# Patient Record
Sex: MALE | Race: WHITE | NOT HISPANIC OR LATINO
[De-identification: names, ages, dates, MRNs, and addresses within clinical notes are randomized per-mention and may not be internally consistent; named-entity substitution may affect disease eponyms.]

---

## 2023-07-12 ENCOUNTER — NON-APPOINTMENT (OUTPATIENT)
Age: 43
End: 2023-07-12

## 2023-07-12 ENCOUNTER — APPOINTMENT (OUTPATIENT)
Dept: GASTROENTEROLOGY | Facility: CLINIC | Age: 43
End: 2023-07-12
Payer: COMMERCIAL

## 2023-07-12 VITALS
BODY MASS INDEX: 24.44 KG/M2 | HEIGHT: 69 IN | WEIGHT: 165 LBS | HEART RATE: 64 BPM | RESPIRATION RATE: 16 BRPM | SYSTOLIC BLOOD PRESSURE: 128 MMHG | DIASTOLIC BLOOD PRESSURE: 83 MMHG | OXYGEN SATURATION: 99 %

## 2023-07-12 DIAGNOSIS — Z12.11 ENCOUNTER FOR SCREENING FOR MALIGNANT NEOPLASM OF COLON: ICD-10-CM

## 2023-07-12 PROBLEM — Z00.00 ENCOUNTER FOR PREVENTIVE HEALTH EXAMINATION: Status: ACTIVE | Noted: 2023-07-12

## 2023-07-12 PROCEDURE — 99203 OFFICE O/P NEW LOW 30 MIN: CPT

## 2023-07-12 RX ORDER — PANTOPRAZOLE 40 MG/1
40 TABLET, DELAYED RELEASE ORAL
Refills: 0 | Status: ACTIVE | COMMUNITY

## 2023-07-12 RX ORDER — ATORVASTATIN CALCIUM 40 MG/1
40 TABLET, FILM COATED ORAL
Refills: 0 | Status: ACTIVE | COMMUNITY

## 2023-07-12 RX ORDER — CLONAZEPAM 2 MG/1
TABLET ORAL
Refills: 0 | Status: ACTIVE | COMMUNITY

## 2023-07-12 RX ORDER — CITALOPRAM HYDROBROMIDE 10 MG/1
TABLET, FILM COATED ORAL
Refills: 0 | Status: ACTIVE | COMMUNITY

## 2023-07-12 NOTE — ASSESSMENT
[FreeTextEntry1] : 44 yo M here for screening colonoscopy, high risk-- father w/ rectal cancer in his 50s\par \par Will plan on a colonoscopy for screening (repeat at least every 5 years)\par Explained the risks (including but not limited to cardiopulmonary / anesthesia complications, bleeding, infection, perforation, aspiration, missed lesions, internal organ injury), benefits, and alternatives. Preparation for the procedure was reviewed with the patient. The patient was informed that she/he would be given IV anesthesia by an anesthesiologist during the procedure. The patient was informed that a family member or friend must drive the patient following recovery from the procedure. The patient understands and agrees, all questions answered. Will use a  suprep bowel prep with clears the day prior. \par \par \par \par

## 2023-07-12 NOTE — HISTORY OF PRESENT ILLNESS
[FreeTextEntry1] : 44 yo M hx HLD,  here for screening colonoscopy\par HIGH risk- father w/ rectal cancer in his 50s\par his first colonoscopy was in 2016 and normal. \par denies GI symptoms \par \par \par Last colonoscopy: 2016- normal, no polyps, 5 years\par \par EGD- many years ago -normal. \par taking PPI for chronic cough, went away. \par \par Soc: no tobacco or significant EtOH\par \par Family Hx:  Father - rectal cancer diagnosed in his 50s\par \par ROS:\par constitutional: no weight loss, fevers\par ENT: no deafness\par Eyes: no blindness\par Neck: no lymphadenopathy\par Chest: no shortness of breath, no cough\par Cardiac: no chest pain, no palpitations\par Vascular: no leg swelling\par GI: no abdominal pain, nausea, vomiting, diarrhea, constipation, rectal bleeding, melena, dysphagia,  unless otherwise noted in HPI\par : no dysuria, dark urine\par Skin: no rashes, lesions, jaundice\par Heme: no bleeding\par Endocrine: no DM  unless otherwise stated in HPI\par \par Physical Exam: (VS noted below)\par General: alert, comfortable, in no acute distress\par Eyes: normal sclera, anicteric\par Neck: normal, supple, no neck mass\par Pulm: no respiratory distress, clear to auscultation bilaterally \par Heart: RRR, normal S1 S2\par Abd: Soft, non-tender, non-distended, normal bowel sounds, no appreciable hepatosplenomegaly, no masses palpated\par Rectal: deferred\par Ext: warm and well perfused, no edema\par Skin: no rashes, no juandice\par Neuro: alert, grossly nonfocal\par \par Labs/imaging/prior endoscopic results were reviewed to the extent available and pertinent findings noted in HPI\par

## 2023-07-12 NOTE — CONSULT LETTER
[Dear  ___] : Dear  [unfilled], [Consult Letter:] : I had the pleasure of evaluating your patient, [unfilled]. [Please see my note below.] : Please see my note below. [Consult Closing:] : Thank you very much for allowing me to participate in the care of this patient.  If you have any questions, please do not hesitate to contact me. [Sincerely,] : Sincerely, [FreeTextEntry3] : Abbi Perdomo M.D.\par

## 2023-09-11 RX ORDER — SODIUM SULFATE, POTASSIUM SULFATE AND MAGNESIUM SULFATE 1.6; 3.13; 17.5 G/177ML; G/177ML; G/177ML
17.5-3.13-1.6 SOLUTION ORAL
Qty: 1 | Refills: 0 | Status: ACTIVE | COMMUNITY
Start: 2023-07-12 | End: 1900-01-01

## 2023-09-15 ENCOUNTER — APPOINTMENT (OUTPATIENT)
Dept: GASTROENTEROLOGY | Facility: HOSPITAL | Age: 43
End: 2023-09-15

## 2023-11-03 ENCOUNTER — APPOINTMENT (OUTPATIENT)
Dept: GASTROENTEROLOGY | Facility: HOSPITAL | Age: 43
End: 2023-11-03